# Patient Record
(demographics unavailable — no encounter records)

---

## 2024-10-28 NOTE — PHYSICAL EXAM
[30888 - High Complexity requires an extensive number of possible diagnoses and/or the management options, extensive complexity of the medical data (tests, etc.) to be reviewed, and a high risk of significant complications, morbidity, and/or mortality as w] : High Complexity

## 2024-10-28 NOTE — PHYSICAL EXAM
[30387 - High Complexity requires an extensive number of possible diagnoses and/or the management options, extensive complexity of the medical data (tests, etc.) to be reviewed, and a high risk of significant complications, morbidity, and/or mortality as w] : High Complexity

## 2024-10-29 NOTE — HISTORY OF PRESENT ILLNESS
[Home] : at home, [unfilled] , at the time of the visit. [Medical Office: ([facility])___] : at the medical office located in  [Family Member] : family member [Discharged with Oxygen] : Discharged with oxygen [[facility]] : Post-hospitalization from [facility] [Pneumonia] : Pneumonia [Respiratory failure with hypoxia and hypercapnia] : Respiratory failure with hypoxia and hypercapnia [Yes] : Yes [Admitted on: ___] : The patient was admitted on [unfilled] [Discharged on ___] : discharged on [unfilled] [Discharge Summary] : discharge summary [Pertinent Labs] : pertinent labs [Radiology Findings] : radiology findings [Discharge Med List] : discharge medication list [Med Reconciliation] : medication reconciliation has been completed [Patient Contacted By: ____] : and contacted by [unfilled] [FreeTextEntry2] : Posthospital discharge telehealth visit.  Patient's son is present, who is also translating  Patient is a 07-year-old male, extensive former tobacco history, COPD, history of lung cancer, as well as esophageal cancer by report.  Treated with radiation and chemotherapy, and per family in remission since 2016.  Patient also with a history of pulmonary embolism apparently since 2019, for which she is maintained on Eliquis.   Patient had presented to the hospital complaints of shortness of breath, cough, left sided chest tightness He was found in hypoxic and hypercapneic respiratory failiure. CT with dense LLL pneumonia, debris in left mainstem, as well as chronic appearing post RT changes. Pt was tx with broad spectrum abx. Initially with bilevel 12/5 cmH20, however this was insufficient , ABG remained with hypercapnea , PCO 68, and pt remained in respiratory distress. He was successfully changed to NIV with improvmement in ABG to compensated (7.41.61) and pt sx and respiratory mechanics. NIV however was not ordered at d/c as pt was in a rush to get home. He was d/c with supplemental 02.  Abx were completed prior to d/c. Pt d/c back on his prior meds, Eliquis. And albuterol and duoneb  Pt has not been feeling much better since getting home He is unable to sleep at all while reclining, due to sob and tightness on the left chest. And even upright with symptoms. He wishes he had the NIV, which helped him during the hospital. He is using 02 at night, and prn daytime. They do have pulse ox. Sometimes dipping to 84% ra when awake.  On video, pt appears well, in no distress.

## 2024-10-29 NOTE — ASSESSMENT
[FreeTextEntry1] : 70-year-old male, former smoker, COPD, prior history of lung cancer radiation and chemotherapy treatments, prior PE on Eliquis.  Admitted for treatment of left lower lobe pneumonia, received a course of broad-spectrum antibiotics.  Patient also with chronic respiratory hypoxemia, as well as chronic respiratory failure with hypercapnia which was insufficiently treated with bilevel during the hospital admission due to persistent elevation of the pCO2 and presence of acidosis in addition to lack of improvement of respiratory mechanics.  Patient was then changed to AVAPS AE in the hospital.  ABG on discharge, after having been on the AVAPS was 7.41 pH, pCO2 61.  CPAP was ruled out and SURENDRA not the predominant cause of the patient's respiratory status. Patient will benefit from home NIV with pressure targeted ventilation, which can be used both at night and as needed during the daytime.  This will help to improve his respiratory mechanics,  help to prevent readmissions or exacerbations in the future.   Patient cannot be treated with a respiratory assist device as this was insufficient when tried it during the recent hospitalization at properly decreasing CO2 levels or improving respiratory mechanics.  The current ventilator is not to be used in our AD, CPAP, or AVAPS mode. Order sent to [facility]  Additionally, patient will need a follow-up CT chest in about 2 weeks to ensure resolution of the pneumonia.  CAT scan ordered, phone number given to his son to call to schedule.

## 2024-10-29 NOTE — HISTORY OF PRESENT ILLNESS
[Home] : at home, [unfilled] , at the time of the visit. [Medical Office: ([facility])___] : at the medical office located in  [Family Member] : family member [Discharged with Oxygen] : Discharged with oxygen [[facility]] : [facility]hospitalization from [facility] [Pneumonia] : Pneumonia [Respiratory failure with hypoxia and hypercapnia] : Respiratory failure with hypoxia and hypercapnia [Yes] : Yes [Admitted on: ___] : The patient was admitted on [unfilled] [Discharged on ___] : discharged on [unfilled] [Discharge Summary] : discharge summary [Pertinent Labs] : pertinent labs [Radiology Findings] : radiology findings [Discharge Med List] : discharge medication list [Med Reconciliation] : medication reconciliation has been completed [Patient Contacted By: ____] : and contacted by [unfilled] [FreeTextEntry2] : Posthospital discharge telehealth visit.  Patient's son is present, who is also translating  Patient is a 07-year-old male, extensive former tobacco history, COPD, history of lung cancer, as well as esophageal cancer by report.  Treated with radiation and chemotherapy, and per family in remission since 2016.  Patient also with a history of pulmonary embolism apparently since 2019, for which she is maintained on Eliquis.   Patient had presented to the hospital complaints of shortness of breath, cough, left sided chest tightness He was found in hypoxic and hypercapneic respiratory failiure. CT with dense LLL pneumonia, debris in left mainstem, as well as chronic appearing post RT changes. Pt was tx with broad spectrum abx. Initially with bilevel 12/5 cmH20, however this was insufficient , ABG remained with hypercapnea , PCO 68, and pt remained in respiratory distress. He was successfully changed to NIV with improvmement in ABG to compensated (7.41.61) and pt sx and respiratory mechanics. NIV however was not ordered at d/c as pt was in a rush to get home. He was d/c with supplemental 02.  Abx were completed prior to d/c. Pt d/c back on his prior meds, Eliquis. And albuterol and duoneb  Pt has not been feeling much better since getting home He is unable to sleep at all while reclining, due to sob and tightness on the left chest. And even upright with symptoms. He wishes he had the NIV, which helped him during the hospital. He is using 02 at night, and prn daytime. They do have pulse ox. Sometimes dipping to 84% ra when awake.  On video, pt appears well, in no distress.

## 2024-11-11 NOTE — REASON FOR VISIT
[Home] : at home, [unfilled] , at the time of the visit. [Other Location: e.g. Home (Enter Location, City,State)___] : at [unfilled] [Spouse] : spouse [Family Member] : family member [Patient] : the patient [Self] : self

## 2024-11-11 NOTE — ASSESSMENT
[FreeTextEntry1] : 69 y/o man, extensive former smoker, h/o esophag cancer (RT tx) and nsclca, PE (on eliquis) Admitted last month for tx LLL pna, acute on chronic resp failure hypoxia and hypercapnea He was d/c with 02, not NIV - was unable to get approval on time  We were able to get him home NIV within days of his hospital d/c However, he is not tolerating it . Wears it 30 min max. In lab study currently scheduled for Februrary Will arrange for NSA asap , try to expedite  Still with same complaints of constant feeling of tightness in his chest He has a f/u CT this week. But the sx do not sound c/w pulmonary. family said sx was better when he was in the hospital, but unclear why wife said he had a cardiac w/u and was "normal" and GI f/u was "normal" they deny he is anxious he is doing 4x daily airway clearance f/u CT... to start

## 2024-11-11 NOTE — HISTORY OF PRESENT ILLNESS
[Former] : former [TextBox_4] : f/u TEB  pt's son and wife present  pt received the temporary IVAPS and has since received the new NIV but he has not been able to tolerate either for more than 30 minutes still c/o pressure in his epigastric / midsternal area , all the time. only feels better with eating can't sleep, feeling sleep deprived. sleep study scheduled for February. per wife  insurance company said form needs to be filled out differently to get approval  also still feels has trouble getting the mucus up from his chest, despite the albuterol/hypersal/aerobika he is schedule for f/u CT later this week  on video, pt on nasal cannula. he appears to be in no distress. breathing appears comforbable. no access muscle use